# Patient Record
(demographics unavailable — no encounter records)

---

## 2024-10-08 NOTE — HISTORY OF PRESENT ILLNESS
[FreeTextEntry1] : NPV- Acne [de-identified] : Dario Marie 27 y/o M presents with his mother for facial acne.    acne on face since 12 years old.  Has been using tretinoin 0.1 and bpo-clinda 2-5% x 12 years . Applying both in PM.  given doxycycline x 1 week for flare 4 months ago, helped.  Using cerave face wash    has chronic dry lips. using neosporin and many different lip balms  Has asymptomatic bumps on nose x few years. wondering if they are acne scars  PMH -asthma, allergies    Personal history of skin cancer: No Family history of skin cancer: No History of blistering sunburns: No History of tanning bed use: No Uses sunscreen regularly: No

## 2024-10-08 NOTE — PHYSICAL EXAM
[Alert] : alert [Oriented x 3] : ~L oriented x 3 [Well Nourished] : well nourished [Conjunctiva Non-injected] : conjunctiva non-injected [No Visual Lymphadenopathy] : no visual  lymphadenopathy [No Clubbing] : no clubbing [No Edema] : no edema [No Bromhidrosis] : no bromhidrosis [No Chromhidrosis] : no chromhidrosis [FreeTextEntry3] : cheeks, nose, forehead: scattered crusted acneiform papules  Nose : monomorphic flesh colored papules   vermillion lips: scaling

## 2024-10-08 NOTE — HISTORY OF PRESENT ILLNESS
[FreeTextEntry1] : NPV- Acne [de-identified] : Dario Marie 27 y/o M presents with his mother for facial acne.    acne on face since 12 years old.  Has been using tretinoin 0.1 and bpo-clinda 2-5% x 12 years . Applying both in PM.  given doxycycline x 1 week for flare 4 months ago, helped.  Using cerave face wash    has chronic dry lips. using neosporin and many different lip balms  Has asymptomatic bumps on nose x few years. wondering if they are acne scars  PMH -asthma, allergies    Personal history of skin cancer: No Family history of skin cancer: No History of blistering sunburns: No History of tanning bed use: No Uses sunscreen regularly: No

## 2024-10-08 NOTE — ASSESSMENT
[FreeTextEntry1] : # Acne- inflammatory -c/w tret .1 cream , increase to nightly -change BPO-clinda gel to every AM instead of PM -start SSS 10-5% wash qAM, continue cerave cleanser in PM - start doxycycline 100mg BID x 12 weeks.  Discussed side effects of photosensitivity and GI upset, rarely allergic reaction, pseudotumor cerebrei.  #Monomorphic nasal papules x years ddx: rosacea, acne, syringomas, angiofibromas, sebaceous hyperplasia, sarcoid Will re-examine in 3 months. if not better, recommend biopsy for further eval. Pt in agreement w/ plan.   #Lip Cheilitis  -stop all current products including balms and neosporin -start aquaphor frequently throughout day  RTC 3 mo

## 2024-10-10 NOTE — RESULTS/DATA
[TextEntry] : RADIOLOGY     PFT Normal spirometry, lung volumes and dlco Crissy: 29 ppb   EKG / ECHO     MICRO      BACTERIAL:       MYCOBACTERIAL:           FUNGAL:     PATH     OTHER LABS OF NOTE

## 2024-10-10 NOTE — HISTORY OF PRESENT ILLNESS
[Never] : never [TextBox_4] : 26 y.o. male with hx of allergies, post-nasal drip, and possible asthma referred for evaluation of chronic cough and asthma  Pt's mother who is a physician states he had PFT evaluation as a child for his "allergies" but was not consistent with a diagnosis of asthma. Pt states that he doesnt feel any chest tightness, or feeling that he's not getting enough air. Pt denies any exacerbations of it with exercise. Denies every waking up in the night with shortness of breath or coughing requiring his inhaler. Denies any hospitalizations or any ED visits for his breathing.  Pt also endorses that in high school he had multiple episodes of syncope which were not preceded by any heavy physical activity. Pt reports that they often happened if he didnt drink enough water and was once evaluated in the ED at [facility] once without any specific findings  Pt reports cough for the past 2 years since covid19 infection. It's been slowly getting better. He was prescribed albuterol followed by Symbicort which he no longer takes as it didn't help. He was also prescribed flonase nasal spray which IS helping. He has intermittent YELENA sx - at least weekly. He has not been seen by ENT or GI.  He was tested for allergies as a child and was positive for "dust and pollen"

## 2024-10-10 NOTE — ASSESSMENT
[FreeTextEntry1] : 26 y.o. male with hx of allergies, post-nasal drip, and possible asthma referred for evaluation of chronic cough and asthma  Common causes of chronic cough include upper airway cough syndrome (previously known as postnasal drip), asthma and GERD.  The patient reports significant improvement in his cough with the use of nasal steroid sprays.  He also has intermittent symptoms of acid reflux.  He does not have symptoms of asthma other than cough.  He has not been using Symbicort or albuterol in a long time without any worsening in his cough, nor any other respiratory symptom development.  His lungs are clear to auscultation.  His PFTs are normal.  Exhaled nitric oxide is indeterminate.  Recommend methacholine challenge test to rule out asthma and ENT evaluation

## 2024-10-31 NOTE — HISTORY OF PRESENT ILLNESS
[FreeTextEntry1] : pt is here for follow up and flu shot [de-identified] : Pt is a 26 y.o. male with PMHx of acne in the past as well as upper airway cough syndrome presenting after seeing pulmonology and dermatology. Pt was recently started on long term doxycycline for his acne and had normal PFTs and DLCO on his pulmonology f/u. Pt states that he is due for a f/u with pulm again in december for continued evaluation of his possible asthma with a methacholine challenge. Denies any other complaints at this time.

## 2024-10-31 NOTE — HISTORY OF PRESENT ILLNESS
[FreeTextEntry1] : pt is here for follow up and flu shot [de-identified] : Pt is a 26 y.o. male with PMHx of acne in the past as well as upper airway cough syndrome presenting after seeing pulmonology and dermatology. Pt was recently started on long term doxycycline for his acne and had normal PFTs and DLCO on his pulmonology f/u. Pt states that he is due for a f/u with pulm again in december for continued evaluation of his possible asthma with a methacholine challenge. Denies any other complaints at this time.

## 2024-10-31 NOTE — END OF VISIT
[] : Resident [FreeTextEntry3] : AUCS, acne, here for follow-up On vit A and doxy for acne. PFTs/DLCO normal. He improved on nasal fluticasone so we will treat for that. C/w 2nd gen antihistamine and fluticasone.  flu shot today

## 2024-10-31 NOTE — HISTORY OF PRESENT ILLNESS
[FreeTextEntry1] : pt is here for follow up and flu shot [de-identified] : Pt is a 26 y.o. male with PMHx of acne in the past as well as upper airway cough syndrome presenting after seeing pulmonology and dermatology. Pt was recently started on long term doxycycline for his acne and had normal PFTs and DLCO on his pulmonology f/u. Pt states that he is due for a f/u with pulm again in december for continued evaluation of his possible asthma with a methacholine challenge. Denies any other complaints at this time.

## 2025-01-07 NOTE — PHYSICAL EXAM
[Alert] : alert [Oriented x 3] : ~L oriented x 3 [Well Nourished] : well nourished [Conjunctiva Non-injected] : conjunctiva non-injected [No Visual Lymphadenopathy] : no visual  lymphadenopathy [No Clubbing] : no clubbing [No Edema] : no edema [No Bromhidrosis] : no bromhidrosis [No Chromhidrosis] : no chromhidrosis [FreeTextEntry3] : cheeks, nose, forehead: hyperpigmented macules , one crusted papule on nasal dorsum   Nose : monomorphic flesh colored papules   vermillion lips: scaling   cheeks and perioral skin: superficial peeling

## 2025-01-07 NOTE — ASSESSMENT
[FreeTextEntry1] : # Acne- inflammatory overall improved  -c/w tret .1 cream , decrease frequency to 2-3 times a week at night due to scaling on face.  -c/w BPO-clinda gel to every AM instead of PM -hold SSS 10-5% wash, because of smell. I advised trying BPO 5% wash qAM instead but if it does not work as well, he is okay with restarting SSS wash.  -s/p 3 months doxy (10/24-1/25)  #Monomorphic nasal papules x years ddx: rosacea, acne, syringomas, angiofibromas, sebaceous hyperplasia, sarcoid unchanged. Pt consents to punch biopsy today for further ddx  Biopsy by 3mm Punch Technique Procedure Note.   Location: right nasal ala.  After discussion of risks, verbal consent was obtained and a time out was performed.  The area was cleaned with an alcohol swab.  Anesthesia: 1% lidocaine with 1:100,000 epinephrine.  Closure with 4-0 Chromic interrupted suture.  Specimen was sent for pathologic examination.  Wound care was reviewed with the patient.  Will contact patient with biopsy results.  #Lip Cheilitis - mild improvement -stop all current products including balms and neosporin -c/w aquaphor frequently throughout day -start hcn 2.5 oint bid for two weeks on and two weeks off as needed for flares  RTC 3 mo or sooner rpn

## 2025-01-07 NOTE — ASSESSMENT
LEFT FLANK PAIN 6/10     Ilia Crawford, RN  08/18/18 2131     [FreeTextEntry1] : # Acne- inflammatory overall improved  -c/w tret .1 cream , decrease frequency to 2-3 times a week at night due to scaling on face.  -c/w BPO-clinda gel to every AM instead of PM -hold SSS 10-5% wash, because of smell. I advised trying BPO 5% wash qAM instead but if it does not work as well, he is okay with restarting SSS wash.  -s/p 3 months doxy (10/24-1/25)  #Monomorphic nasal papules x years ddx: rosacea, acne, syringomas, angiofibromas, sebaceous hyperplasia, sarcoid unchanged. Pt consents to punch biopsy today for further ddx  Biopsy by 3mm Punch Technique Procedure Note.   Location: right nasal ala.  After discussion of risks, verbal consent was obtained and a time out was performed.  The area was cleaned with an alcohol swab.  Anesthesia: 1% lidocaine with 1:100,000 epinephrine.  Closure with 4-0 Chromic interrupted suture.  Specimen was sent for pathologic examination.  Wound care was reviewed with the patient.  Will contact patient with biopsy results.  #Lip Cheilitis - mild improvement -stop all current products including balms and neosporin -c/w aquaphor frequently throughout day -start hcn 2.5 oint bid for two weeks on and two weeks off as needed for flares  RTC 3 mo or sooner rpn

## 2025-01-07 NOTE — HISTORY OF PRESENT ILLNESS
[FreeTextEntry1] : RPA- acne, nasal papules, cheilitis  [de-identified] : Dario Marie 25 y/o M presents for F/U.   -acne overall better on cheeks. However bumps on nose unchanged.  Finished 3 months doxycycline Still using tret every night, BPO-clinda every morning, SSS cleanser in the AM.  -Cheilitis: using aquaphor daily, thinks a little better but still has peeling. denies picking or biting skin on lips. ___ 10/8/24 Dario Marie 25 y/o M presents with his mother for facial acne.    acne on face since 12 years old.  Has been using tretinoin 0.1 and bpo-clinda 2-5% x 12 years . Applying both in PM.  given doxycycline x 1 week for flare 4 months ago, helped.  Using cerave face wash    has chronic dry lips. using neosporin and many different lip balms  Has asymptomatic bumps on nose x few years. wondering if they are acne scars  PMH -asthma, allergies    Personal history of skin cancer: No Family history of skin cancer: No History of blistering sunburns: No History of tanning bed use: No Uses sunscreen regularly: No

## 2025-01-07 NOTE — HISTORY OF PRESENT ILLNESS
[FreeTextEntry1] : RPA- acne, nasal papules, cheilitis  [de-identified] : Dario Marie 27 y/o M presents for F/U.   -acne overall better on cheeks. However bumps on nose unchanged.  Finished 3 months doxycycline Still using tret every night, BPO-clinda every morning, SSS cleanser in the AM.  -Cheilitis: using aquaphor daily, thinks a little better but still has peeling. denies picking or biting skin on lips. ___ 10/8/24 Dario Marie 27 y/o M presents with his mother for facial acne.    acne on face since 12 years old.  Has been using tretinoin 0.1 and bpo-clinda 2-5% x 12 years . Applying both in PM.  given doxycycline x 1 week for flare 4 months ago, helped.  Using cerave face wash    has chronic dry lips. using neosporin and many different lip balms  Has asymptomatic bumps on nose x few years. wondering if they are acne scars  PMH -asthma, allergies    Personal history of skin cancer: No Family history of skin cancer: No History of blistering sunburns: No History of tanning bed use: No Uses sunscreen regularly: No

## 2025-02-13 NOTE — HISTORY OF PRESENT ILLNESS
[FreeTextEntry1] : Telehealth RPA- review results [de-identified] : Dario Marie 25 y/o M presents for telehealth F/U.   ________________________ LV- 1/7/25 -acne overall better on cheeks. However bumps on nose unchanged.  Finished 3 months doxycycline Still using tret every night, BPO-clinda every morning, SSS cleanser in the AM.  -Cheilitis: using aquaphor daily, thinks a little better but still has peeling. denies picking or biting skin on lips. ___ 10/8/24 Dario Maire 25 y/o M presents with his mother for facial acne.    acne on face since 12 years old.  Has been using tretinoin 0.1 and bpo-clinda 2-5% x 12 years . Applying both in PM.  given doxycycline x 1 week for flare 4 months ago, helped.  Using cerave face wash    has chronic dry lips. using neosporin and many different lip balms  Has asymptomatic bumps on nose x few years. wondering if they are acne scars  PMH -asthma, allergies    Personal history of skin cancer: No Family history of skin cancer: No History of blistering sunburns: No History of tanning bed use: No Uses sunscreen regularly: No

## 2025-02-13 NOTE — HISTORY OF PRESENT ILLNESS
[FreeTextEntry1] : Telehealth RPA- review results [de-identified] : Dario Marie 25 y/o M presents for telehealth F/U.   ________________________ LV- 1/7/25 -acne overall better on cheeks. However bumps on nose unchanged.  Finished 3 months doxycycline Still using tret every night, BPO-clinda every morning, SSS cleanser in the AM.  -Cheilitis: using aquaphor daily, thinks a little better but still has peeling. denies picking or biting skin on lips. ___ 10/8/24 Dario Marie 25 y/o M presents with his mother for facial acne.    acne on face since 12 years old.  Has been using tretinoin 0.1 and bpo-clinda 2-5% x 12 years . Applying both in PM.  given doxycycline x 1 week for flare 4 months ago, helped.  Using cerave face wash    has chronic dry lips. using neosporin and many different lip balms  Has asymptomatic bumps on nose x few years. wondering if they are acne scars  PMH -asthma, allergies    Personal history of skin cancer: No Family history of skin cancer: No History of blistering sunburns: No History of tanning bed use: No Uses sunscreen regularly: No

## 2025-02-13 NOTE — ASSESSMENT
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  Discussed that treatment can be difficult.  -start metrocream bid to AA -continue SSS wash daily -restart doxycycline 100mg bid x 3 months  -follow up in 2-3 months. If not improved, may start isotret  # Acne- inflammatory overall improved. s/p 3 months doxy (10/24-1/25) -c/w tret .1 cream , decrease frequency to 2-3 times a week at night due to scaling on face.  -c/w BPO-clinda gel to every AM instead of PM -resume SSS 10-5% wash daily, given bx + for rosacea on nose -restart doxy as above  #Lip Cheilitis -stop all current products including balms and neosporin -c/w aquaphor frequently throughout day -c/w hcn 2.5 oint bid for two weeks on and two weeks off as needed for flares  RTC in 2 months already scheduled

## 2025-04-08 NOTE — HISTORY OF PRESENT ILLNESS
[FreeTextEntry1] : KRYSTIAN - Rosacea  [de-identified] : Dario Marie 27 y/o M presents for F/U of rosacea.  #acne on face continuing to be well controlled. On tret, BPO clinda gel and SSS in AM. no hx depression #Rosacea on nose- minimal improvement. has finished 2 months doxycycline and using metrocream #Dry lips. still dry.  about the same. aquaphor helps.  denies lip licking. says he only peels skin off sides of lip when about to fall off,  had not used hcn ointment, was unsure what rx was for ________________________ LV- 1/7/25 -acne overall better on cheeks. However bumps on nose unchanged.  Finished 3 months doxycycline Still using tret every night, BPO-clinda every morning, SSS cleanser in the AM.  -Cheilitis: using aquaphor daily, thinks a little better but still has peeling. denies picking or biting skin on lips. ___ 10/8/24 Dario Marie 27 y/o M presents with his mother for facial acne.    acne on face since 12 years old.  Has been using tretinoin 0.1 and bpo-clinda 2-5% x 12 years . Applying both in PM.  given doxycycline x 1 week for flare 4 months ago, helped.  Using cerave face wash    has chronic dry lips. using neosporin and many different lip balms  Has asymptomatic bumps on nose x few years. wondering if they are acne scars  PMH -asthma, allergies    Personal history of skin cancer: No Family history of skin cancer: No History of blistering sunburns: No History of tanning bed use: No Uses sunscreen regularly: No

## 2025-04-08 NOTE — ASSESSMENT
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  Discussed that treatment can be difficult.  -insufficient response to metrocream and 2 mo doxycycline.   -continue SSS wash daily -continue doxycycline 100mg bid x additional 3 months  -start tacrolimus 0.1 oint bid to AA -follow up in 2-3 months. If not improved, may start isotret. Discussed today, he declines for now.   # Acne- inflammatory overall improved. s/p 3 months doxy (10/24-1/25) -c/w tret .1 cream  2-3 times a week at night -c/w BPO-clinda gel to AA qAM -c/w  SSS 10-5% wash daily - c/w doxy as above  #Lip Cheilitis -stop all current products including balms and neosporin advised him to avoid licking or chewing lip, avoid peeling skin off lips.  -c/w aquaphor frequently throughout day -START hcn 2.5 oint bid for two weeks on and two weeks off as needed for flares  RTC in 2-3 mo

## 2025-04-08 NOTE — PHYSICAL EXAM
[Alert] : alert [Oriented x 3] : ~L oriented x 3 [Well Nourished] : well nourished [Conjunctiva Non-injected] : conjunctiva non-injected [No Visual Lymphadenopathy] : no visual  lymphadenopathy [No Clubbing] : no clubbing [No Edema] : no edema [No Bromhidrosis] : no bromhidrosis [No Chromhidrosis] : no chromhidrosis [FreeTextEntry3] : cheeks, nose, forehead: hyperpigmented macules   Nose : monomorphic flesh colored papules   vermillion lips: mild scaling

## 2025-04-08 NOTE — HISTORY OF PRESENT ILLNESS
[FreeTextEntry1] : KRYSTIAN - Rosacea  [de-identified] : Dario Marie 27 y/o M presents for F/U of rosacea.  #acne on face continuing to be well controlled. On tret, BPO clinda gel and SSS in AM. no hx depression #Rosacea on nose- minimal improvement. has finished 2 months doxycycline and using metrocream #Dry lips. still dry.  about the same. aquaphor helps.  denies lip licking. says he only peels skin off sides of lip when about to fall off,  had not used hcn ointment, was unsure what rx was for ________________________ LV- 1/7/25 -acne overall better on cheeks. However bumps on nose unchanged.  Finished 3 months doxycycline Still using tret every night, BPO-clinda every morning, SSS cleanser in the AM.  -Cheilitis: using aquaphor daily, thinks a little better but still has peeling. denies picking or biting skin on lips. ___ 10/8/24 Dario Marie 27 y/o M presents with his mother for facial acne.    acne on face since 12 years old.  Has been using tretinoin 0.1 and bpo-clinda 2-5% x 12 years . Applying both in PM.  given doxycycline x 1 week for flare 4 months ago, helped.  Using cerave face wash    has chronic dry lips. using neosporin and many different lip balms  Has asymptomatic bumps on nose x few years. wondering if they are acne scars  PMH -asthma, allergies    Personal history of skin cancer: No Family history of skin cancer: No History of blistering sunburns: No History of tanning bed use: No Uses sunscreen regularly: No